# Patient Record
Sex: MALE | Race: ASIAN | NOT HISPANIC OR LATINO | URBAN - METROPOLITAN AREA
[De-identification: names, ages, dates, MRNs, and addresses within clinical notes are randomized per-mention and may not be internally consistent; named-entity substitution may affect disease eponyms.]

---
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
## 2022-08-14 ENCOUNTER — EMERGENCY (EMERGENCY)
Facility: HOSPITAL | Age: 22
LOS: 1 days | Discharge: HOME | End: 2022-08-14
Attending: EMERGENCY MEDICINE | Admitting: EMERGENCY MEDICINE

## 2022-08-14 VITALS
TEMPERATURE: 98 F | HEART RATE: 78 BPM | SYSTOLIC BLOOD PRESSURE: 126 MMHG | OXYGEN SATURATION: 100 % | RESPIRATION RATE: 16 BRPM | DIASTOLIC BLOOD PRESSURE: 68 MMHG

## 2022-08-14 PROCEDURE — 69209 REMOVE IMPACTED EAR WAX UNI: CPT | Mod: 50

## 2022-08-14 PROCEDURE — 99283 EMERGENCY DEPT VISIT LOW MDM: CPT | Mod: 25

## 2022-08-14 RX ORDER — CIPROFLOXACIN AND DEXAMETHASONE 3; 1 MG/ML; MG/ML
4 SUSPENSION/ DROPS AURICULAR (OTIC)
Qty: 15 | Refills: 0
Start: 2022-08-14 | End: 2022-08-20

## 2022-08-14 NOTE — ED PROVIDER NOTE - NSFOLLOWUPINSTRUCTIONS_ED_ALL_ED_FT
Earwax Blockage    WHAT YOU NEED TO KNOW:    What is earwax blockage? Earwax can build up in your ear canal and cause a blockage. Earwax blockage happens when your ear makes earwax faster than your body can remove it.  Ear Anatomy    What causes earwax blockage?    Narrow or abnormally shaped ear canals    Overgrowth of bone in your ear canal    Skin disease, such as eczema    Autoimmune disease, such as lupus    An injury that causes your body to make more earwax    Foreign objects in the ear    Using cotton swabs to clean your ears    Use of a hearing aid or ear plugs  What are the signs and symptoms of earwax blockage?    Trouble hearing    Earache    Ear fullness or a feeling that something is plugging up your ear    Itching or ringing in your ear    Dizziness  How is earwax blockage treated?    Medicines placed in the ear canal can soften the earwax so it will come out.    Flushing your ear canal with warm water may flush out the earwax.    Small medical tools may be used to remove the earwax.  How can I prevent earwax blockage? Do not stick anything into your ears to clean them. Use cotton swabs on the outside of your ear only. Ask your healthcare provider for more information on ways to prevent blockage.    When should I seek immediate care?    You feel dizzy.    You have discharge or blood coming out of your ear.    Your ear pain does not go away or gets worse.  When should I call my doctor?    You have a fever.    You have trouble hearing or hear ringing noises.    You have questions or concerns about your condition or care.  CARE AGREEMENT:    You have the right to help plan your care. Learn about your health condition and how it may be treated. Discuss treatment options with your healthcare providers to decide what care you want to receive. You always have the right to refuse treatment.

## 2022-08-14 NOTE — ED PROCEDURE NOTE - PROCEDURE ADDITIONAL DETAILS
+ cerumen removed from bilateral ears with visualized bilateral TM and intact.  Mild canal irritation/erythema bilaterally noted.      Patient tolerated procedure well.  Now able to hear from Left ear.

## 2022-08-14 NOTE — ED PROVIDER NOTE - PATIENT PORTAL LINK FT
You can access the FollowMyHealth Patient Portal offered by [facility] by registering at the following website: [email or web address]. By joining [employer]’s FollowMyHealth portal, you will also be able to view your health information using other applications (apps) compatible with our system.

## 2022-08-14 NOTE — ED PROVIDER NOTE - ATTENDING CONTRIBUTION TO CARE
Santosh: I have seen and examined the patient face to face, have reviewed and addended the HPI, PE and a/p as necessary.    22 yo M with no PMH a/w 1 day of L ear pain and felt ears were clogged.  Pt reports going to pool yesterday.  Attempted to use qtip and debrox to remove wax with did not help.  Pt reports decreased hearing on L ear.  No fever/chills, No photophobia/eye pain/changes in vision, No ear pain/sore throat/dysphagia, No chest pain/palpitations, no SOB/cough/wheeze/stridor, No abd pain, No N/V/D, no dysuria/frequency/discharge, No neck/back pain, no rash, no changes in neurological status/function.    Ears - bilateral ears with cerumen impaction bilaterally.  s/p irrigation with slight erythema of bilateral ear canals, TM noted to be intact    - d/c on ciprodex given erthyema of canals bilaterally  - follow up with ENT.

## 2022-08-14 NOTE — ED PROVIDER NOTE - OBJECTIVE STATEMENT
20 yo M, denies medical hx, presents with 1 day of L ear pain. Patient reports that he went to the pool yesterday and felt his ears were clogged afterwards. He tried to unclog them with a q-tip which worsened the situation, and then used an OTC ear wax removal kit 3 times which did not improve his condition. Endorses muffled hearing in the L ear, normal hearing but mild pain in the R ear. No nausea, vomiting, dizziness, vision changes.

## 2022-08-14 NOTE — ED PROVIDER NOTE - NS ED ROS FT
CONSTITUTIONAL: No fevers, no chills, no lightheadedness, no dizziness  EYES: no visual changes, no eye pain  EARS: +ear pain, +change in hearing in L ear  NOSE: no nasal congestion  MOUTH/THROAT: no sore throat  CV: No chest pain, no palpitations  RESP: No SOB, no cough  GI: No n/v/d, no abd pain  : no dysuria, no hematuria, no flank pain  MSK: no back pain, no extremity pain  SKIN: no rashes  NEURO: no headache, no focal weakness, no decreased sensation/parasthesias   PSYCHIATRIC: no known mental health issues

## 2022-08-14 NOTE — ED PROVIDER NOTE - PHYSICAL EXAMINATION
Physical Exam:  Gen: NAD, AOx3, non-toxic appearing, able to ambulate without assistance  Head: NCAT  HEENT: EOMI, PEERLA, normal conjunctiva, tongue midline, oral mucosa moist  Ears: bilateral internal canal irritation, significant cerumen, unable to fully visualize the tympanic membrane on L side  Lung: CTAB, no respiratory distress, no wheezes/rhonchi/rales B/L, speaking in full sentences  CV: RRR, no murmurs, rubs or gallops  Abd: soft, NT, ND, no guarding, no rigidity, no rebound tenderness, no CVA tenderness   MSK: no visible deformities, ROM normal in UE/LE, no back pain  Neuro: No focal sensory or motor deficits  Skin: Warm, well perfused, no rash, no leg swelling  Psych: normal affect, calm

## 2022-08-14 NOTE — ED PROVIDER NOTE - CLINICAL SUMMARY MEDICAL DECISION MAKING FREE TEXT BOX
22 yo M, denies medical hx, presents with 1 day of L ear pain after multiple attempts of cerumen disimpaction at home. Well-appearing, vitals wnl. Both ears noticeably irritated with visible cerumen. Will irrigate with saline-peroxide mixture, dc with antibiotics.

## 2022-08-14 NOTE — ED ADULT TRIAGE NOTE - CHIEF COMPLAINT QUOTE
pt c/o left ear pain.  reports ear feels clogged. he tried using an earwax removal kit with no improvement. also, c/o headache. denies fever/chills.

## 2022-08-14 NOTE — ED PROVIDER NOTE - NSFOLLOWUPCLINICS_GEN_ALL_ED_FT
[facility] - ENT  Otolaryngology (ENT)  [address] [place] [address]  Phone: [phone]  Fax:

## 2023-08-23 ENCOUNTER — EMERGENCY (EMERGENCY)
Facility: HOSPITAL | Age: 23
LOS: 1 days | Discharge: HOME | End: 2023-08-23
Admitting: STUDENT IN AN ORGANIZED HEALTH CARE EDUCATION/TRAINING PROGRAM
Payer: COMMERCIAL

## 2023-08-23 VITALS
OXYGEN SATURATION: 100 % | TEMPERATURE: 98 F | SYSTOLIC BLOOD PRESSURE: 124 MMHG | HEART RATE: 70 BPM | RESPIRATION RATE: 16 BRPM | DIASTOLIC BLOOD PRESSURE: 74 MMHG

## 2023-08-23 VITALS
RESPIRATION RATE: 16 BRPM | SYSTOLIC BLOOD PRESSURE: 114 MMHG | HEART RATE: 84 BPM | OXYGEN SATURATION: 100 % | DIASTOLIC BLOOD PRESSURE: 74 MMHG | TEMPERATURE: 98 F

## 2023-08-23 PROCEDURE — 99284 EMERGENCY DEPT VISIT MOD MDM: CPT

## 2023-08-23 PROCEDURE — 99283 EMERGENCY DEPT VISIT LOW MDM: CPT

## 2023-08-23 PROCEDURE — 73630 X-RAY EXAM OF FOOT: CPT | Mod: 26,LT

## 2023-08-23 RX ORDER — IBUPROFEN 200 MG
600 TABLET ORAL ONCE
Refills: 0 | Status: COMPLETED | OUTPATIENT
Start: 2023-08-23 | End: 2023-08-23

## 2023-08-23 RX ADMIN — Medication 600 MILLIGRAM(S): at 23:21

## 2023-08-23 RX ADMIN — Medication 600 MILLIGRAM(S): at 22:30

## 2023-08-23 NOTE — ED PROVIDER NOTE - PATIENT PORTAL LINK FT
You can access the FollowMyHealth Patient Portal offered by [facility] by registering at the following website: [email or web address]. By joining [employer]’s FollowMyHealth portal, you will also be able to view your health information using other applications (apps) compatible with our system.

## 2023-08-23 NOTE — ED PROVIDER NOTE - NSFOLLOWUPINSTRUCTIONS_ED_ALL_ED_FT
Rest, drink plenty of fluids.  Advance activity as tolerated.  Continue all previously prescribed medications as directed.  Follow up with your primary care physician in 48-72 hours- bring copies of your results.  Return to the ER for worsening or persistent symptoms, and/or ANY NEW OR CONCERNING SYMPTOMS. If you have issues obtaining follow up, please call: [phone] to obtain a doctor or specialist who takes your insurance in your area.     Take Motrin/Ibuprofen 600 mg every 6-8 hours as needed for moderate pain -- take with food.   Rest. Elevation of left foot.  Ace bandage for support.       Our Discharge Lounge will contact you for appointment with orthopedic.

## 2023-08-23 NOTE — ED PROVIDER NOTE - CLINICAL SUMMARY MEDICAL DECISION MAKING FREE TEXT BOX
23 yo M w/ no PMH presents to ED c/o left heel pain after playing basketball 2 days ago. well appearing male. afebrile. lower suspicion for fracture. Plan: Xray, pain control, and reassess, likely d/c w/ ortho.

## 2023-08-23 NOTE — ED ADULT NURSE NOTE - NSFALLRISKINTERV_ED_ALL_ED
Assistance OOB with selected safe patient handling equipment if applicable/Assistance with ambulation/Communicate fall risk and risk factors to all staff, patient, and family/Monitor gait and stability/Provide visual cue: yellow wristband, yellow gown, etc/Reinforce activity limits and safety measures with patient and family/Call bell, personal items and telephone in reach/Instruct patient to call for assistance before getting out of bed/chair/stretcher/Non-slip footwear applied when patient is off stretcher/[place] to call system/Physically safe environment - no spills, clutter or unnecessary equipment/Purposeful Proactive Rounding/Room/bathroom lighting operational, light cord in reach

## 2023-08-23 NOTE — ED ADULT TRIAGE NOTE - CHIEF COMPLAINT QUOTE
Pt c/o left ankle and heel pain x3 days. States was playing basketball and landed wrong from a jump. No obvious deformity at triage. Denies PMHx

## 2023-08-23 NOTE — ED PROVIDER NOTE - PHYSICAL EXAMINATION
left foot: mild tenderness to lateral aspect of heel, no erythema, no edema, no ecchymosis. skin intact. sensation intact. moving all toes.   left ankle: good ROM, no bony tenderness. no edema. no erythema. no ecchymosis. able to dorsiflex and plantarflex.   pulses intact.

## 2023-08-23 NOTE — ED ADULT NURSE NOTE - OBJECTIVE STATEMENT
22 year old male received to [facility], AAOx4 ambulatory with impaired gait. Pt c/o left heel pain starting Monday night after playing basketball. Pt states he jumped and landed on his left heel very hard. Pt denies twisting ankle or falling. Pt denies numbness/tingling. Skin intact, no bruising/bleeding/swelling noted. +ROM. Pt unable to bear weight on left heel. VS as noted. Ice pack applied. Awaiting MD gutierrez

## 2025-05-27 ENCOUNTER — APPOINTMENT (OUTPATIENT)
Dept: UROLOGY | Facility: CLINIC | Age: 25
End: 2025-05-27

## 2025-06-28 ENCOUNTER — EMERGENCY (EMERGENCY)
Facility: HOSPITAL | Age: 25
LOS: 1 days | End: 2025-06-28
Admitting: STUDENT IN AN ORGANIZED HEALTH CARE EDUCATION/TRAINING PROGRAM
Payer: COMMERCIAL

## 2025-06-28 VITALS
RESPIRATION RATE: 17 BRPM | HEIGHT: 75 IN | TEMPERATURE: 97 F | WEIGHT: 199.96 LBS | OXYGEN SATURATION: 99 % | SYSTOLIC BLOOD PRESSURE: 111 MMHG | HEART RATE: 109 BPM | DIASTOLIC BLOOD PRESSURE: 76 MMHG

## 2025-06-28 PROCEDURE — 73610 X-RAY EXAM OF ANKLE: CPT | Mod: 26,LT

## 2025-06-28 PROCEDURE — 73630 X-RAY EXAM OF FOOT: CPT | Mod: 26,LT

## 2025-06-28 PROCEDURE — 99284 EMERGENCY DEPT VISIT MOD MDM: CPT

## 2025-06-28 PROCEDURE — 73590 X-RAY EXAM OF LOWER LEG: CPT | Mod: 26,LT

## 2025-06-28 RX ORDER — IBUPROFEN 200 MG
600 TABLET ORAL ONCE
Refills: 0 | Status: COMPLETED | OUTPATIENT
Start: 2025-06-28 | End: 2025-06-28

## 2025-06-28 RX ADMIN — Medication 600 MILLIGRAM(S): at 22:21

## 2025-06-28 NOTE — ED ADULT NURSE NOTE - NSFALLHARMRISKINTERV_ED_ALL_ED

## 2025-06-28 NOTE — ED PROVIDER NOTE - PATIENT PORTAL LINK FT
You can access the FollowMyHealth Patient Portal offered by [facility] by registering at the following website: [email or web address]. By joining [employer]’s FollowMyHealth portal, you will also be able to view your health information using other applications (apps) compatible with our system.

## 2025-06-28 NOTE — ED PROVIDER NOTE - PROGRESS NOTE DETAILS
Pt informed RN he was having some chest tightness, EKG performed, NSR, no acute ischemic changes noted, upon reassessment, pt states his chest tightness has subsided. Pt agrees he is somewhat anxious, will continue to monitor and reassess, pending xray ankle read

## 2025-06-28 NOTE — ED PROVIDER NOTE - LOWER EXTREMITY EXAM, LEFT
no obvious swelling, erythema, or deformity, + TTP to lateral malleolus, unable to range at this time, sensation and strength intact/TENDERNESS

## 2025-06-28 NOTE — ED ADULT NURSE NOTE - OBJECTIVE STATEMENT
pt received to room 9. pt a&ox4 ambulatory at baseline. no pmhx noted. pt c/o playing basketball, going up for a rebound and landing awkwardly on his left ankle, causing him to fall over. pt stated he heard a cracking sound, and was unable to walk or bear weight on his ankle since the incident. pt stated he was helped off the court and taken to ed. pt denied hitting his head or syncope. pt breathing with equal and bilateral chest rise, and rated his pain as 10/10. pt stated he was unable to move his toes or ankle at all.

## 2025-06-28 NOTE — ED PROVIDER NOTE - CONSTITUTIONAL, MLM
nontoxic although anxious appearing, awake, alert, oriented to person, place, time/situation and in mild distress. normal...

## 2025-06-28 NOTE — ED PROVIDER NOTE - OBJECTIVE STATEMENT
23 y/o M no PMH c/o left ankle pain after inversion injury while landing playing basketball today. Pt unable to weight beat after incident. Admits to decreased sensation to dorsum of foot. Denies fever, chills, other bodily injury/head trauma.

## 2025-06-29 VITALS
RESPIRATION RATE: 18 BRPM | HEART RATE: 79 BPM | OXYGEN SATURATION: 96 % | DIASTOLIC BLOOD PRESSURE: 67 MMHG | SYSTOLIC BLOOD PRESSURE: 134 MMHG | TEMPERATURE: 98 F

## 2025-06-29 PROCEDURE — 93010 ELECTROCARDIOGRAM REPORT: CPT

## 2025-06-29 NOTE — ED PROCEDURE NOTE - CPROC ED TIME OUT STATEMENT1
“Patient's name, , procedure and correct site were confirmed during the [place] Timeout.”

## 2025-06-30 ENCOUNTER — NON-APPOINTMENT (OUTPATIENT)
Age: 25
End: 2025-06-30

## 2025-06-30 ENCOUNTER — TRANSCRIPTION ENCOUNTER (OUTPATIENT)
Age: 25
End: 2025-06-30

## 2025-06-30 PROBLEM — Z00.00 ENCOUNTER FOR PREVENTIVE HEALTH EXAMINATION: Status: ACTIVE | Noted: 2025-06-30

## 2025-07-01 ENCOUNTER — APPOINTMENT (OUTPATIENT)
Age: 25
End: 2025-07-01

## 2025-07-01 PROBLEM — S93.402A LEFT ANKLE SPRAIN: Status: ACTIVE | Noted: 2025-07-01

## 2025-07-01 PROBLEM — M25.572 ACUTE LEFT ANKLE PAIN: Status: ACTIVE | Noted: 2025-07-01

## 2025-07-01 PROCEDURE — 73610 X-RAY EXAM OF ANKLE: CPT | Mod: LT

## 2025-07-01 PROCEDURE — 99203 OFFICE O/P NEW LOW 30 MIN: CPT

## 2025-08-05 ENCOUNTER — APPOINTMENT (OUTPATIENT)
Dept: ORTHOPEDIC SURGERY | Facility: CLINIC | Age: 25
End: 2025-08-05